# Patient Record
Sex: FEMALE | Race: WHITE | NOT HISPANIC OR LATINO | Employment: OTHER | URBAN - METROPOLITAN AREA
[De-identification: names, ages, dates, MRNs, and addresses within clinical notes are randomized per-mention and may not be internally consistent; named-entity substitution may affect disease eponyms.]

---

## 2020-03-11 ENCOUNTER — OFFICE VISIT (OUTPATIENT)
Dept: PAIN MEDICINE | Facility: CLINIC | Age: 70
End: 2020-03-11

## 2020-03-11 VITALS
RESPIRATION RATE: 18 BRPM | WEIGHT: 268 LBS | HEART RATE: 59 BPM | SYSTOLIC BLOOD PRESSURE: 150 MMHG | TEMPERATURE: 97.5 F | BODY MASS INDEX: 47.48 KG/M2 | OXYGEN SATURATION: 93 % | HEIGHT: 63 IN | DIASTOLIC BLOOD PRESSURE: 86 MMHG

## 2020-03-11 DIAGNOSIS — M54.16 LUMBAR RADICULOPATHY: Primary | ICD-10-CM

## 2020-03-11 DIAGNOSIS — E66.01 MORBIDLY OBESE (HCC): ICD-10-CM

## 2020-03-11 PROCEDURE — 99204 OFFICE O/P NEW MOD 45 MIN: CPT | Performed by: NURSE PRACTITIONER

## 2020-03-11 RX ORDER — ASPIRIN 81 MG/1
81 TABLET ORAL DAILY
COMMUNITY

## 2020-03-11 RX ORDER — ATORVASTATIN CALCIUM 40 MG/1
TABLET, FILM COATED ORAL DAILY
COMMUNITY
Start: 2019-12-04

## 2020-03-11 RX ORDER — MULTIVITAMIN
1 CAPSULE ORAL DAILY
COMMUNITY

## 2020-03-11 RX ORDER — TRAMADOL HYDROCHLORIDE 50 MG/1
50 TABLET ORAL EVERY 6 HOURS PRN
COMMUNITY
Start: 2020-03-05 | End: 2020-07-22

## 2020-03-11 RX ORDER — PREDNISONE 10 MG/1
TABLET ORAL
COMMUNITY
Start: 2020-03-05 | End: 2020-07-22

## 2020-03-11 NOTE — PROGRESS NOTES
CHIEF COMPLAINT  Ms. Behne has back pain that started 30 years ago. She states that her back pain radiates down her left leg.    Initial evaluation by FREDDY Corral    Subjective   Mary L Behne is a 69 y.o. female.   She presents to the office for evaluation of back pain and consideration of spinal cord stimulator phase 2. She was referred here by Dr. Freeman.     The patient's pain today is 9/10VAS in severity.  She states that her back pain started 30 years ago and has been progressing. Procedure visit from 2/26/2020 with Dr. Freeman reviewed.  The patient underwent placement of 2 percutaneous Abbott spinal cord stimulator leads using fluoroscopic guidance the patient was discharged home following this procedure.  She followed up in office on 3/3/2020 with Dr. Freeman.  Review of that office note shows 80% pain relief and a pain level of 2 out of 10 in her low back throughout the duration of her trial.  She was referred to our office for spinal cord stimulator phase 2 implantation.    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  She lives with her , son, and daughter.  She denies any history of tobacco use, she denies any history of alcohol use, and she denies any history of illicit substance use.  She states she does have family history of alcoholism and drug addiction (son).  She does have a family history of neck and back problems (both sons).     Patient has been working with [employer] with [employer].     Past pain medications: None     Current pain medications: Tramadol 50mg q6h PRN     Past therapies:  Physical Therapy: Yes, several rounds, not efficacious  Chiropractor: No  Massage Therapy: Yes  TENS: Yes, helpful at first, no longer efficacious  Neck or back surgery: L4-L5 discectomy & L5-S1 discectomy (2 separate procedures, performed in [place] and then in [place]).  Past pain management: [facility]- sees Dr. Freeman     Previous  Injections: most recently 12/16/20 LESI, pain relief x 6 days.     Under the care of Dr. Freeman the patient states she has had multiple epidural injections, lumbar MBB, and RFL without lasting pain relief.    Back Pain   This is a chronic problem. The current episode started more than 1 year ago. The problem occurs constantly. The problem has been gradually worsening since onset. The pain is present in the lumbar spine. The quality of the pain is described as aching and shooting (sharp). The pain radiates to the left thigh and left foot (posterior). The pain is at a severity of 9/10. The pain is severe. The pain is the same all the time. Pertinent negatives include no numbness or weakness. Risk factors include history of steroid use, menopause, obesity and poor posture (multiple injections, multiple joint surgeries (Bilateral hips and knees)). She has tried analgesics, bed rest, heat, ice, muscle relaxant and NSAIDs (PT, injections) for the symptoms. The treatment provided significant (SCS phase 1 trial ) relief.      PEG Assessment   What number best describes your pain on average in the past week?9  What number best describes how, during the past week, pain has interfered with your enjoyment of life?9  What number best describes how, during the past week, pain has interfered with your general activity?  9    Current Outpatient Medications:   •  aspirin 81 MG EC tablet, Take 81 mg by mouth Daily., Disp: , Rfl:   •  atorvastatin (LIPITOR) 40 MG tablet, , Disp: , Rfl:   •  Cholecalciferol (VITAMIN D3) 25 MCG (1000 UT) capsule, Take 1 capsule by mouth Daily., Disp: , Rfl:   •  Multiple Vitamin (MULTIVITAMIN) capsule, Take 1 capsule by mouth Daily., Disp: , Rfl:   •  predniSONE (DELTASONE) 10 MG tablet, TAKE 4 DAILY X 3 DAYS, THEN 3 X 3 DAYS, THEN 2 X 3 DAYS, THEN 1 X 5 AND STOP., Disp: , Rfl:   •  traMADol (ULTRAM) 50 MG tablet, Take 50 mg by mouth Every 6 (Six) Hours As Needed., Disp: , Rfl:     The following  "portions of the patient's history were reviewed and updated as appropriate: allergies, current medications, past family history, past medical history, past social history, past surgical history and problem list.    REVIEW OF PERTINENT MEDICAL DATA                Review of Systems   Constitutional: Positive for fatigue.   HENT: Negative for congestion.    Eyes: Positive for visual disturbance.   Respiratory: Negative for cough, shortness of breath and wheezing.    Cardiovascular: Negative.    Gastrointestinal: Negative for constipation and diarrhea.   Genitourinary: Negative for difficulty urinating.   Musculoskeletal: Positive for back pain.   Neurological: Negative for weakness and numbness.   Psychiatric/Behavioral: Negative for sleep disturbance and suicidal ideas. The patient is nervous/anxious.      Vitals:    03/11/20 0952   BP: 150/86   Pulse: 59   Resp: 18   Temp: 97.5 °F (36.4 °C)   SpO2: 93%   Weight: 122 kg (268 lb)   Height: 160 cm (63\")   PainSc:   9   PainLoc: Back     Objective   Physical Exam   Constitutional: She is oriented to person, place, and time. Vital signs are normal. She appears well-developed and well-nourished.   HENT:   Head: Normocephalic and atraumatic.   Eyes: Conjunctivae, EOM and lids are normal.   Neck: Trachea normal and normal range of motion.   Cardiovascular: Normal rate.   Pulmonary/Chest: Effort normal.   Abdominal: Normal appearance.   Musculoskeletal:        Lumbar back: She exhibits decreased range of motion, tenderness and pain.   POS Elvis SLR   Neurological: She is alert and oriented to person, place, and time. Gait abnormal.   Skin: Skin is warm, dry and intact.   Psychiatric: She has a normal mood and affect. Her speech is normal and behavior is normal. Judgment and thought content normal.   Nursing note and vitals reviewed.    Assessment/Plan   Jeri was seen today for back pain.    Diagnoses and all orders for this visit:    Lumbar radiculopathy  -     Case " Request    Morbidly obese (CMS/[facility])    --- Review of psychological evaluation for implantation of SCS report  --- Spinal cord stimulation Phase 2  With [employer]  ----------  Education about Spinal Cord Stimulation Phase 2... Implant of the SCS therapy:      -  This was an extended office visit in which we entered into discussion about advanced pain relieving techniques, and discussed implantable pain therapies.  We discussed advanced neuromodulation in the form of Spinal Cord Stimulation.  This is a reasonable therapy for patients who have exhausted basic nonnarcotic options, basic modalities and physical therapies, and do not have any other reasonable surgical options.  This therapy as an alternative to long term high dose opioid therapy.      -  Risks include but are not limited to bleeding, infection, injury, paralysis, nerve injury, dural puncture, and risk for postprocedural pain.  Implanted equipment risks include but are not limited to lead migration, lead fracture, risk of loss of pain relieving stimulation, risk of electrical shock, and risk of system failure.      - We discussed the theory and basic science behind SCS therapy including but not limited to energy delivery and relevant anatomy, in terms that are easy to understand and also with use of illustrative devices.  Spinal Cord Stimulation therapies apply an electromagnetic field to a specific area on the spinal cord (Dorsal Column) to attempt to block transmission of painful signals from the peripheral nerves to the brain.      -  We reviewed the education this patient received, and the fact that the patient had a favorable presurgical psychological evaluation.       - We discussed the successful trialing process (aka Phase 1) that this patient experienced.  We discussed the noted improvements in pain control &/or functional status during the trial.   Trial success of at least 50% relief determines whether or not we proceed to implant.  We  discussed reasonable expectations, and that I feel that consistent 50% pain relief during the trial deems it to have been medically successful, and is a reasonable expectation to justify moving forward to permanent implant.      - We discussed the percutaneous surgical implant, including postsurgical restrictions, risks, and alternatives.   For spinal cord stimulation implanted device (aka SCS Phase 2) there is usually a midline vertical incision for the spinally implanted leads, and also a horizontal incision in the posterior lumbar flank for implantation of the battery & computer (aka IPG).  The leads are tunneled from the midline incision to the medial aspect of the battery pocket incision.      - We plan to place the permanent electrodes in the same spinal region where the temporary trial produced relief.      -  Postoperative restrictions include limiting the following activity as much as possible for 90 days:  Lifting >10 lbs, bending at the waist, stretching/reaching overhead, and twisting.  During this time the patient is asked to not drive with the device turned on.    ----------  --- Follow-up after procedure      DMITRI REPORT    As part of the patient's treatment plan, I am prescribing controlled substances. The patient has been made aware of appropriate use of such medications, including potential risk of somnolence, limited ability to drive and/or work safely, and the potential for dependence or overdose. It has also bee made clear that these medications are for use by this patient only, without concomitant use of alcohol or other substances unless prescribed.     Patient has completed prescribing agreement detailing terms of continued prescribing of controlled substances, including monitoring DMITRI reports, urine drug screening, and pill counts if necessary. The patient is aware that inappropriate use will results in cessation of prescribing such medications.    DMITRI report has been reviewed and  scanned into the patient's chart.    As the clinician, I personally reviewed the DMITRI from 3/10/2020 while the patient was in the office today.    History and physical exam exhibit continued safe and appropriate use of controlled substances.     EMR Dragon/Transcription disclaimer:   Much of this encounter note is an electronic transcription/translation of spoken language to printed text. The electronic translation of spoken language may permit erroneous, or at times, nonsensical words or phrases to be inadvertently transcribed; Although I have reviewed the note for such errors, some may still exist.

## 2020-03-11 NOTE — PATIENT INSTRUCTIONS
----------  Education about Spinal Cord Stimulation Phase 2... Implant of the SCS therapy:      -  This was an extended office visit in which we entered into discussion about advanced pain relieving techniques, and discussed implantable pain therapies.  We discussed advanced neuromodulation in the form of Spinal Cord Stimulation.  This is a reasonable therapy for patients who have exhausted basic nonnarcotic options, basic modalities and physical therapies, and do not have any other reasonable surgical options.  This therapy as an alternative to long term high dose opioid therapy.      -  Risks include but are not limited to bleeding, infection, injury, paralysis, nerve injury, dural puncture, and risk for postprocedural pain.  Implanted equipment risks include but are not limited to lead migration, lead fracture, risk of loss of pain relieving stimulation, risk of electrical shock, and risk of system failure.      - We discussed the theory and basic science behind SCS therapy including but not limited to energy delivery and relevant anatomy, in terms that are easy to understand and also with use of illustrative devices.  Spinal Cord Stimulation therapies apply an electromagnetic field to a specific area on the spinal cord (Dorsal Column) to attempt to block transmission of painful signals from the peripheral nerves to the brain.      -  We reviewed the education this patient received, and the fact that the patient had a favorable presurgical psychological evaluation.       - We discussed the successful trialing process (aka Phase 1) that this patient experienced.  We discussed the noted improvements in pain control &/or functional status during the trial.   Trial success of at least 50% relief determines whether or not we proceed to implant.  We discussed reasonable expectations, and that I feel that consistent 50% pain relief during the trial deems it to have been medically successful, and is a reasonable  expectation to justify moving forward to permanent implant.      - We discussed the percutaneous surgical implant, including postsurgical restrictions, risks, and alternatives.   For spinal cord stimulation implanted device (aka SCS Phase 2) there is usually a midline vertical incision for the spinally implanted leads, and also a horizontal incision in the posterior lumbar flank for implantation of the battery & computer (aka IPG).  The leads are tunneled from the midline incision to the medial aspect of the battery pocket incision.      - We plan to place the permanent electrodes in the same spinal region where the temporary trial produced relief.      -  Postoperative restrictions include limiting the following activity as much as possible for 90 days:  Lifting >10 lbs, bending at the waist, stretching/reaching overhead, and twisting.  During this time the patient is asked to not drive with the device turned on.    ----------

## 2020-05-07 ENCOUNTER — TELEPHONE (OUTPATIENT)
Dept: PAIN MEDICINE | Facility: CLINIC | Age: 70
End: 2020-05-07

## 2020-05-07 NOTE — TELEPHONE ENCOUNTER
Pt called today to inquire about her SPINAL CORD STIMULATOR INSERTION PHASE 2 and when she will be implanted? Please advise. Thank you.

## 2020-05-08 NOTE — TELEPHONE ENCOUNTER
Because of excess BMI her implant must take place at the hospital.  We could schedule it whenever we get some time there.

## 2020-07-21 ENCOUNTER — TRANSCRIBE ORDERS (OUTPATIENT)
Dept: PREADMISSION TESTING | Facility: HOSPITAL | Age: 70
End: 2020-07-21

## 2020-07-21 DIAGNOSIS — Z01.818 OTHER SPECIFIED PRE-OPERATIVE EXAMINATION: Primary | ICD-10-CM

## 2020-07-22 ENCOUNTER — APPOINTMENT (OUTPATIENT)
Dept: PREADMISSION TESTING | Facility: HOSPITAL | Age: 70
End: 2020-07-22

## 2020-07-22 ENCOUNTER — HOSPITAL ENCOUNTER (OUTPATIENT)
Dept: GENERAL RADIOLOGY | Facility: HOSPITAL | Age: 70
Discharge: HOME | End: 2020-07-22
Admitting: ANESTHESIOLOGY

## 2020-07-22 VITALS
HEART RATE: 71 BPM | WEIGHT: 268 LBS | BODY MASS INDEX: 47.48 KG/M2 | DIASTOLIC BLOOD PRESSURE: 79 MMHG | SYSTOLIC BLOOD PRESSURE: 139 MMHG | TEMPERATURE: 99.2 F | HEIGHT: 63 IN | OXYGEN SATURATION: 98 % | RESPIRATION RATE: 18 BRPM

## 2020-07-22 LAB
ANION GAP SERPL CALCULATED.3IONS-SCNC: 12.5 MMOL/L (ref 5–15)
BUN SERPL-MCNC: 19 MG/DL (ref 8–23)
BUN/CREAT SERPL: 22.1 (ref 7–25)
CALCIUM SPEC-SCNC: 9.4 MG/DL (ref 8.6–10.5)
CHLORIDE SERPL-SCNC: 104 MMOL/L (ref 98–107)
CO2 SERPL-SCNC: 23.5 MMOL/L (ref 22–29)
CREAT SERPL-MCNC: 0.86 MG/DL (ref 0.57–1)
DEPRECATED RDW RBC AUTO: 44.3 FL (ref 37–54)
ERYTHROCYTE [DISTWIDTH] IN BLOOD BY AUTOMATED COUNT: 12.9 % (ref 12.3–15.4)
GFR SERPL CREATININE-BSD FRML MDRD: 65 ML/MIN/1.73
GLUCOSE SERPL-MCNC: 99 MG/DL (ref 65–99)
HCT VFR BLD AUTO: 45.4 % (ref 34–46.6)
HGB BLD-MCNC: 15.7 G/DL (ref 12–15.9)
MCH RBC QN AUTO: 32.6 PG (ref 26.6–33)
MCHC RBC AUTO-ENTMCNC: 34.6 G/DL (ref 31.5–35.7)
MCV RBC AUTO: 94.4 FL (ref 79–97)
PLATELET # BLD AUTO: 213 10*3/MM3 (ref 140–450)
PMV BLD AUTO: 11.1 FL (ref 6–12)
POTASSIUM SERPL-SCNC: 4.2 MMOL/L (ref 3.5–5.2)
RBC # BLD AUTO: 4.81 10*6/MM3 (ref 3.77–5.28)
SODIUM SERPL-SCNC: 140 MMOL/L (ref 136–145)
WBC # BLD AUTO: 6.09 10*3/MM3 (ref 3.4–10.8)

## 2020-07-22 PROCEDURE — 87081 CULTURE SCREEN ONLY: CPT | Performed by: ANESTHESIOLOGY

## 2020-07-22 PROCEDURE — 36415 COLL VENOUS BLD VENIPUNCTURE: CPT

## 2020-07-22 PROCEDURE — 80048 BASIC METABOLIC PNL TOTAL CA: CPT | Performed by: ANESTHESIOLOGY

## 2020-07-22 PROCEDURE — 85027 COMPLETE CBC AUTOMATED: CPT | Performed by: ANESTHESIOLOGY

## 2020-07-22 PROCEDURE — 71046 X-RAY EXAM CHEST 2 VIEWS: CPT

## 2020-07-22 PROCEDURE — 93005 ELECTROCARDIOGRAM TRACING: CPT

## 2020-07-22 PROCEDURE — 93010 ELECTROCARDIOGRAM REPORT: CPT | Performed by: INTERNAL MEDICINE

## 2020-07-22 NOTE — DISCHARGE INSTRUCTIONS
Take the following medications the morning of surgery: NONE    ARRIVE  AM    General Instructions:  • Do not eat solid food after midnight the night before surgery.  • You may drink clear liquids day of surgery but must stop at least one hour before your hospital arrival time.  • It is beneficial for you to have a clear drink that contains carbohydrates the day of surgery.  We suggest a 12 to 20 ounce bottle of Gatorade or Powerade for non-diabetic patients or a 12 to 20 ounce bottle of G2 or Powerade Zero for diabetic patients. (Pediatric patients, are not advised to drink a 12 to 20 ounce carbohydrate drink)    Clear liquids are liquids you can see through.  Nothing red in color.     Plain water                               Sports drinks  Sodas                                   Gelatin (Jell-O)  Fruit juices without pulp such as white grape juice and apple juice  Popsicles that contain no fruit or yogurt  Tea or coffee (no cream or milk added)  Gatorade / Powerade  G2 / Powerade Zero    • Infants may have breast milk up to four hours before surgery.  • Infants drinking formula may drink formula up to six hours before surgery.   • Patients who avoid smoking, chewing tobacco and alcohol for 4 weeks prior to surgery have a reduced risk of post-operative complications.  Quit smoking as many days before surgery as you can.  • Do not smoke, use chewing tobacco or drink alcohol the day of surgery.   • If applicable bring your C-PAP/ BI-PAP machine.  • Bring any papers given to you in the doctor’s office.  • Wear clean comfortable clothes.  • Do not wear contact lenses, false eyelashes or make-up.  Bring a case for your glasses.   • Bring crutches or walker if applicable.  • Remove all piercings.  Leave jewelry and any other valuables at home.  • Hair extensions with metal clips must be removed prior to surgery.  • The Pre-Admission Testing nurse will instruct you to bring medications if unable to obtain an  accurate list in Pre-Admission Testing.            Preventing a Surgical Site Infection:  • For 2 to 3 days before surgery, avoid shaving with a razor because the razor can irritate skin and make it easier to develop an infection.    • Any areas of open skin can increase the risk of a post-operative wound infection by allowing bacteria to enter and travel throughout the body.  Notify your surgeon if you have any skin wounds / rashes even if it is not near the expected surgical site.  The area will need assessed to determine if surgery should be delayed until it is healed.  • The night prior to surgery shower using a fresh bar of anti-bacterial soap (such as Dial) and clean washcloth.  Sleep in a clean bed with clean clothing.  Do not allow pets to sleep with you.  • Shower on the morning of surgery using a fresh bar of anti-bacterial soap (such as Dial) and clean washcloth.  Dry with a clean towel and dress in clean clothing.  • Ask your surgeon if you will be receiving antibiotics prior to surgery.  • Make sure you, your family, and all healthcare providers clean their hands with soap and water or an alcohol based hand  before caring for you or your wound.    Day of surgery:  Your arrival time is approximately two hours before your scheduled surgery time.  Upon arrival, a Pre-op nurse and Anesthesiologist will review your health history, obtain vital signs, and answer questions you may have.  The only belongings needed at this time will be a list of your home medications and if applicable your C-PAP/BI-PAP machine.  If you are staying overnight your family can leave the rest of your belongings in the car and bring them to your room later.  A Pre-op nurse will start an IV and you may receive medication in preparation for surgery, including something to help you relax.  Your family will be able to see you in the Pre-op area.  Two visitors at a time will be allowed in the Pre-op room.  While you are in  surgery your family should notify the waiting room  if they leave the waiting room area and provide a contact phone number.    Please be aware that surgery does come with discomfort.  We want to make every effort to control your discomfort so please discuss any uncontrolled symptoms with your nurse.   Your doctor will most likely have prescribed pain medications.      If you are going home after surgery you will receive individualized written care instructions before being discharged.  A responsible adult must drive you to and from the hospital on the day of your surgery and stay with you for 24 hours.    If you are staying overnight following surgery, you will be transported to your hospital room following the recovery period.  [facility] has all private rooms.    If you have any questions please call Pre-Admission Testing at [phone].  Deductibles and co-payments are collected on the day of service. Please be prepared to pay the required co-pay, deductible or deposit on the day of service as defined by your plan.    Patient Education for Self-Quarantine Process    Following your COVID testing, we strongly recommend that you do not leave your home after you have been tested for COVID except to get medical care. This includes not going to work, school or to public areas.  If this is not possible for you to do please limit your activities to only required outings.  Be sure to wear a mask when you are with other people, practice social distancing and wash your hands frequently.      The following items provide additional details to keep you safe.  • Wash your hands with soap and water frequently for at least 20 seconds.   • Avoid touching your eyes, nose and mouth with unwashed hands.  • Do not share anything - utensils, towels, food from the same bowl.   • Have your own utensils, drinking glass, dishes, towels and bedding.   • Do not have visitors.   • Do use FaceTime to stay in touch  with family and friends.  • You should stay in a specific room away from others if possible.   • Stay at least 6 feet away from others in the home if you cannot have a dedicated room to yourself.   • Do not snuggle with your pet. While the CDC says there is no evidence that pets can spread COVID-19 or be infected from humans, it is probably best to avoid “petting, snuggling, being kissed or licked and sharing food (during self-quarantine)”, according to the CDC.   • Sanitize household surfaces daily. Include all high touch areas (door handles, light switches, phones, countertops, etc.)  • Do not share a bathroom with others, if possible.   • Wear a mask around others in your home if you are unable to stay in a separate room or 6 feet apart. If  you are unable to wear a mask, have your family member wear a mask if they must be within 6 feet of you.   Call your surgeon immediately if you experience any of the following symptoms:  • Sore Throat  • Shortness of Breath or difficulty breathing  • Cough  • Chills  • Body soreness or muscle pain  • Headache  • Fever  • New loss of taste or smell  • Do not arrive for your surgery ill.  Your procedure will need to be rescheduled to another time.  You will need to call your physician before the day of surgery to avoid any unnecessary exposure to hospital staff as well as other patients.

## 2020-07-23 LAB — MRSA SPEC QL CULT: NORMAL

## 2020-07-24 ENCOUNTER — LAB (OUTPATIENT)
Dept: LAB | Facility: HOSPITAL | Age: 70
End: 2020-07-24

## 2020-07-24 DIAGNOSIS — Z01.818 OTHER SPECIFIED PRE-OPERATIVE EXAMINATION: ICD-10-CM

## 2020-07-24 PROCEDURE — C9803 HOPD COVID-19 SPEC COLLECT: HCPCS

## 2020-07-24 PROCEDURE — U0004 COV-19 TEST NON-CDC HGH THRU: HCPCS

## 2020-07-25 LAB
REF LAB TEST METHOD: NORMAL
SARS-COV-2 RNA RESP QL NAA+PROBE: NOT DETECTED

## 2020-07-26 PROCEDURE — S0260 H&P FOR SURGERY: HCPCS | Performed by: ANESTHESIOLOGY

## 2020-07-26 RX ORDER — SULFAMETHOXAZOLE AND TRIMETHOPRIM 800; 160 MG/1; MG/1
1 TABLET ORAL 2 TIMES DAILY
Qty: 20 TABLET | Refills: 0 | Status: SHIPPED | OUTPATIENT
Start: 2020-07-26

## 2020-07-27 ENCOUNTER — HOSPITAL ENCOUNTER (OUTPATIENT)
Facility: HOSPITAL | Age: 70
Setting detail: HOSPITAL OUTPATIENT SURGERY
Discharge: HOME | End: 2020-07-27
Attending: ANESTHESIOLOGY | Admitting: ANESTHESIOLOGY

## 2020-07-27 ENCOUNTER — ANESTHESIA (OUTPATIENT)
Dept: PERIOP | Facility: HOSPITAL | Age: 70
End: 2020-07-27

## 2020-07-27 ENCOUNTER — APPOINTMENT (OUTPATIENT)
Dept: GENERAL RADIOLOGY | Facility: HOSPITAL | Age: 70
End: 2020-07-27

## 2020-07-27 ENCOUNTER — ANESTHESIA EVENT (OUTPATIENT)
Dept: PERIOP | Facility: HOSPITAL | Age: 70
End: 2020-07-27

## 2020-07-27 VITALS
TEMPERATURE: 97.9 F | BODY MASS INDEX: 47.31 KG/M2 | RESPIRATION RATE: 16 BRPM | SYSTOLIC BLOOD PRESSURE: 146 MMHG | DIASTOLIC BLOOD PRESSURE: 81 MMHG | OXYGEN SATURATION: 94 % | HEIGHT: 63 IN | HEART RATE: 69 BPM | WEIGHT: 267 LBS

## 2020-07-27 PROCEDURE — 25010000002 VANCOMYCIN 10 G RECONSTITUTED SOLUTION: Performed by: ANESTHESIOLOGY

## 2020-07-27 PROCEDURE — C1778 LEAD, NEUROSTIMULATOR: HCPCS | Performed by: ANESTHESIOLOGY

## 2020-07-27 PROCEDURE — 63650 IMPLANT NEUROELECTRODES: CPT | Performed by: ANESTHESIOLOGY

## 2020-07-27 PROCEDURE — 25010000002 ONDANSETRON PER 1 MG: Performed by: NURSE ANESTHETIST, CERTIFIED REGISTERED

## 2020-07-27 PROCEDURE — 25010000002 PROPOFOL 10 MG/ML EMULSION: Performed by: NURSE ANESTHETIST, CERTIFIED REGISTERED

## 2020-07-27 PROCEDURE — 76000 FLUOROSCOPY <1 HR PHYS/QHP: CPT

## 2020-07-27 PROCEDURE — 63685 INS/RPLC SPI NPG/RCVR POCKET: CPT | Performed by: ANESTHESIOLOGY

## 2020-07-27 PROCEDURE — 25010000002 DEXAMETHASONE PER 1 MG: Performed by: NURSE ANESTHETIST, CERTIFIED REGISTERED

## 2020-07-27 PROCEDURE — 25010000002 FENTANYL CITRATE (PF) 100 MCG/2ML SOLUTION: Performed by: NURSE ANESTHETIST, CERTIFIED REGISTERED

## 2020-07-27 PROCEDURE — 72100 X-RAY EXAM L-S SPINE 2/3 VWS: CPT

## 2020-07-27 PROCEDURE — 25010000002 NEOSTIGMINE PER 0.5 MG: Performed by: NURSE ANESTHETIST, CERTIFIED REGISTERED

## 2020-07-27 PROCEDURE — C1767 GENERATOR, NEURO NON-RECHARG: HCPCS | Performed by: ANESTHESIOLOGY

## 2020-07-27 PROCEDURE — 25010000003 CEFAZOLIN PER 500 MG: Performed by: ANESTHESIOLOGY

## 2020-07-27 DEVICE — ANCHR LD SCS SWIFTLOCK 1192: Type: IMPLANTABLE DEVICE | Site: BACK | Status: FUNCTIONAL

## 2020-07-27 DEVICE — ABSORBABLE HEMOSTAT (OXIDIZED REGENERATED CELLULOSE, U.S.P.)
Type: IMPLANTABLE DEVICE | Site: BACK | Status: FUNCTIONAL
Brand: SURGICEL

## 2020-07-27 DEVICE — LD NEUROSTM OCTRODE 8/CHANEL 52MM 60CM: Type: IMPLANTABLE DEVICE | Site: BACK | Status: FUNCTIONAL

## 2020-07-27 DEVICE — GEN IPG SCS PROCLAIM/ELITE/5 NONRECHG 10N: Type: IMPLANTABLE DEVICE | Site: BACK | Status: FUNCTIONAL

## 2020-07-27 RX ORDER — LIDOCAINE HYDROCHLORIDE 20 MG/ML
INJECTION, SOLUTION INFILTRATION; PERINEURAL AS NEEDED
Status: DISCONTINUED | OUTPATIENT
Start: 2020-07-27 | End: 2020-07-27 | Stop reason: SURG

## 2020-07-27 RX ORDER — FLUMAZENIL 0.1 MG/ML
0.2 INJECTION INTRAVENOUS AS NEEDED
Status: DISCONTINUED | OUTPATIENT
Start: 2020-07-27 | End: 2020-07-27 | Stop reason: HOSPADM

## 2020-07-27 RX ORDER — BUPIVACAINE HYDROCHLORIDE AND EPINEPHRINE 5; 5 MG/ML; UG/ML
INJECTION, SOLUTION EPIDURAL; INTRACAUDAL; PERINEURAL AS NEEDED
Status: DISCONTINUED | OUTPATIENT
Start: 2020-07-27 | End: 2020-07-27 | Stop reason: HOSPADM

## 2020-07-27 RX ORDER — PROPOFOL 10 MG/ML
VIAL (ML) INTRAVENOUS AS NEEDED
Status: DISCONTINUED | OUTPATIENT
Start: 2020-07-27 | End: 2020-07-27 | Stop reason: SURG

## 2020-07-27 RX ORDER — HYDROCODONE BITARTRATE AND ACETAMINOPHEN 7.5; 325 MG/1; MG/1
1 TABLET ORAL ONCE AS NEEDED
Status: DISCONTINUED | OUTPATIENT
Start: 2020-07-27 | End: 2020-07-27 | Stop reason: HOSPADM

## 2020-07-27 RX ORDER — SODIUM CHLORIDE, SODIUM LACTATE, POTASSIUM CHLORIDE, CALCIUM CHLORIDE 600; 310; 30; 20 MG/100ML; MG/100ML; MG/100ML; MG/100ML
9 INJECTION, SOLUTION INTRAVENOUS CONTINUOUS
Status: DISCONTINUED | OUTPATIENT
Start: 2020-07-27 | End: 2020-07-27 | Stop reason: HOSPADM

## 2020-07-27 RX ORDER — FENTANYL CITRATE 50 UG/ML
50 INJECTION, SOLUTION INTRAMUSCULAR; INTRAVENOUS
Status: DISCONTINUED | OUTPATIENT
Start: 2020-07-27 | End: 2020-07-27 | Stop reason: HOSPADM

## 2020-07-27 RX ORDER — MIDAZOLAM HYDROCHLORIDE 1 MG/ML
1 INJECTION INTRAMUSCULAR; INTRAVENOUS
Status: DISCONTINUED | OUTPATIENT
Start: 2020-07-27 | End: 2020-07-27 | Stop reason: HOSPADM

## 2020-07-27 RX ORDER — SODIUM CHLORIDE 0.9 % (FLUSH) 0.9 %
3-10 SYRINGE (ML) INJECTION AS NEEDED
Status: DISCONTINUED | OUTPATIENT
Start: 2020-07-27 | End: 2020-07-27 | Stop reason: HOSPADM

## 2020-07-27 RX ORDER — FAMOTIDINE 10 MG/ML
20 INJECTION, SOLUTION INTRAVENOUS ONCE
Status: COMPLETED | OUTPATIENT
Start: 2020-07-27 | End: 2020-07-27

## 2020-07-27 RX ORDER — HYDROMORPHONE HYDROCHLORIDE 1 MG/ML
0.5 INJECTION, SOLUTION INTRAMUSCULAR; INTRAVENOUS; SUBCUTANEOUS
Status: DISCONTINUED | OUTPATIENT
Start: 2020-07-27 | End: 2020-07-27 | Stop reason: HOSPADM

## 2020-07-27 RX ORDER — ACETAMINOPHEN 325 MG/1
650 TABLET ORAL ONCE AS NEEDED
Status: DISCONTINUED | OUTPATIENT
Start: 2020-07-27 | End: 2020-07-27 | Stop reason: HOSPADM

## 2020-07-27 RX ORDER — SODIUM CHLORIDE 0.9 % (FLUSH) 0.9 %
3 SYRINGE (ML) INJECTION EVERY 12 HOURS SCHEDULED
Status: DISCONTINUED | OUTPATIENT
Start: 2020-07-27 | End: 2020-07-27 | Stop reason: HOSPADM

## 2020-07-27 RX ORDER — ONDANSETRON 2 MG/ML
4 INJECTION INTRAMUSCULAR; INTRAVENOUS ONCE AS NEEDED
Status: DISCONTINUED | OUTPATIENT
Start: 2020-07-27 | End: 2020-07-27 | Stop reason: HOSPADM

## 2020-07-27 RX ORDER — DEXAMETHASONE SODIUM PHOSPHATE 10 MG/ML
INJECTION INTRAMUSCULAR; INTRAVENOUS AS NEEDED
Status: DISCONTINUED | OUTPATIENT
Start: 2020-07-27 | End: 2020-07-27 | Stop reason: SURG

## 2020-07-27 RX ORDER — HYDRALAZINE HYDROCHLORIDE 20 MG/ML
5 INJECTION INTRAMUSCULAR; INTRAVENOUS
Status: DISCONTINUED | OUTPATIENT
Start: 2020-07-27 | End: 2020-07-27 | Stop reason: HOSPADM

## 2020-07-27 RX ORDER — FENTANYL CITRATE 50 UG/ML
INJECTION, SOLUTION INTRAMUSCULAR; INTRAVENOUS AS NEEDED
Status: DISCONTINUED | OUTPATIENT
Start: 2020-07-27 | End: 2020-07-27 | Stop reason: SURG

## 2020-07-27 RX ORDER — ONDANSETRON 2 MG/ML
INJECTION INTRAMUSCULAR; INTRAVENOUS AS NEEDED
Status: DISCONTINUED | OUTPATIENT
Start: 2020-07-27 | End: 2020-07-27 | Stop reason: SURG

## 2020-07-27 RX ORDER — MAGNESIUM HYDROXIDE 1200 MG/15ML
LIQUID ORAL AS NEEDED
Status: DISCONTINUED | OUTPATIENT
Start: 2020-07-27 | End: 2020-07-27 | Stop reason: HOSPADM

## 2020-07-27 RX ORDER — GLYCOPYRROLATE 0.2 MG/ML
INJECTION INTRAMUSCULAR; INTRAVENOUS AS NEEDED
Status: DISCONTINUED | OUTPATIENT
Start: 2020-07-27 | End: 2020-07-27 | Stop reason: SURG

## 2020-07-27 RX ORDER — ROCURONIUM BROMIDE 10 MG/ML
INJECTION, SOLUTION INTRAVENOUS AS NEEDED
Status: DISCONTINUED | OUTPATIENT
Start: 2020-07-27 | End: 2020-07-27 | Stop reason: SURG

## 2020-07-27 RX ORDER — LIDOCAINE HYDROCHLORIDE 10 MG/ML
0.5 INJECTION, SOLUTION EPIDURAL; INFILTRATION; INTRACAUDAL; PERINEURAL ONCE AS NEEDED
Status: DISCONTINUED | OUTPATIENT
Start: 2020-07-27 | End: 2020-07-27 | Stop reason: HOSPADM

## 2020-07-27 RX ORDER — NALOXONE HCL 0.4 MG/ML
0.2 VIAL (ML) INJECTION AS NEEDED
Status: DISCONTINUED | OUTPATIENT
Start: 2020-07-27 | End: 2020-07-27 | Stop reason: HOSPADM

## 2020-07-27 RX ADMIN — DEXAMETHASONE SODIUM PHOSPHATE 4 MG: 10 INJECTION INTRAMUSCULAR; INTRAVENOUS at 08:50

## 2020-07-27 RX ADMIN — ONDANSETRON HYDROCHLORIDE 4 MG: 2 SOLUTION INTRAMUSCULAR; INTRAVENOUS at 10:07

## 2020-07-27 RX ADMIN — FENTANYL CITRATE 50 MCG: 50 INJECTION INTRAMUSCULAR; INTRAVENOUS at 08:41

## 2020-07-27 RX ADMIN — NEOSTIGMINE METHYLSULFATE 3 MG: 1 INJECTION INTRAMUSCULAR; INTRAVENOUS; SUBCUTANEOUS at 10:49

## 2020-07-27 RX ADMIN — LIDOCAINE HYDROCHLORIDE 60 MG: 20 INJECTION, SOLUTION INFILTRATION; PERINEURAL at 08:41

## 2020-07-27 RX ADMIN — GLYCOPYRROLATE 0.6 MG: 0.2 INJECTION INTRAMUSCULAR; INTRAVENOUS at 10:49

## 2020-07-27 RX ADMIN — FENTANYL CITRATE 25 MCG: 50 INJECTION INTRAMUSCULAR; INTRAVENOUS at 09:44

## 2020-07-27 RX ADMIN — VANCOMYCIN HYDROCHLORIDE 1750 MG: 10 INJECTION, POWDER, LYOPHILIZED, FOR SOLUTION INTRAVENOUS at 08:14

## 2020-07-27 RX ADMIN — ROCURONIUM BROMIDE 40 MG: 10 INJECTION INTRAVENOUS at 08:41

## 2020-07-27 RX ADMIN — FENTANYL CITRATE 25 MCG: 50 INJECTION INTRAMUSCULAR; INTRAVENOUS at 10:40

## 2020-07-27 RX ADMIN — SODIUM CHLORIDE, POTASSIUM CHLORIDE, SODIUM LACTATE AND CALCIUM CHLORIDE: 600; 310; 30; 20 INJECTION, SOLUTION INTRAVENOUS at 08:36

## 2020-07-27 RX ADMIN — PROPOFOL 160 MG: 10 INJECTION, EMULSION INTRAVENOUS at 08:41

## 2020-07-27 RX ADMIN — FAMOTIDINE 20 MG: 10 INJECTION INTRAVENOUS at 08:19

## 2020-07-27 NOTE — ANESTHESIA PROCEDURE NOTES
Airway  Urgency: elective    Date/Time: 7/27/2020 8:42 AM  Airway not difficult    General Information and Staff    Patient location during procedure: OR  Anesthesiologist: Akhil Munguia MD  CRNA: Astrid Barnes CRNA    Indications and Patient Condition  Indications for airway management: airway protection    Preoxygenated: yes (pt pre-O2 with 100% O2)  Mask difficulty assessment: 2 - vent by mask + OA or adjuvant +/- NMBA (easy BMV )    Final Airway Details  Final airway type: endotracheal airway      Successful airway: ETT  Cuffed: yes   Successful intubation technique: direct laryngoscopy  Endotracheal tube insertion site: oral  Blade: Charlie  Blade size: 3  ETT size (mm): 7.0  Cormack-Lehane Classification: grade I - full view of glottis  Placement verified by: chest auscultation and capnometry   Cuff volume (mL): 7  Measured from: lips  ETT/EBT  to lips (cm): 20  Number of attempts at approach: 1  Assessment: lips, teeth, and gum same as pre-op and atraumatic intubation    Additional Comments  ATOETx1. No change in dentition.

## 2020-07-27 NOTE — ANESTHESIA POSTPROCEDURE EVALUATION
Patient: Mary L Behne    Procedure Summary     Date:  07/27/20 Room / Location:  [facility] OR 06 / [facility] MAIN OR    Anesthesia Start:  0836 Anesthesia Stop:  1105    Procedure:  SPINAL CORD STIMULATOR INSERTION PHASE 2 (N/A Back) Diagnosis:       Lumbar radiculopathy      (Lumbar radiculopathy [M54.16])    Surgeon:  Evaristo Reveles MD Provider:  Akhil Munguia MD    Anesthesia Type:  general ASA Status:  3          Anesthesia Type: general    Vitals  Vitals Value Taken Time   /79 7/27/2020 11:10 AM   Temp     Pulse 70 7/27/2020 11:11 AM   Resp     SpO2 96 % 7/27/2020 11:11 AM   Vitals shown include unvalidated device data.        Post Anesthesia Care and Evaluation    Patient location during evaluation: PACU  Patient participation: complete - patient participated  Level of consciousness: awake and alert  Pain management: adequate  Airway patency: patent  Anesthetic complications: No anesthetic complications    Cardiovascular status: acceptable  Respiratory status: acceptable  Hydration status: acceptable    Comments: -------------------------              07/27/20                    0759        -------------------------   BP:         152/82        Pulse:        73          Resp:         20          Temp:   36.7 °C (98 °F)   SpO2:         97%        -------------------------

## 2020-07-27 NOTE — ANESTHESIA PREPROCEDURE EVALUATION
Anesthesia Evaluation     NPO Solid Status: > 8 hours             Airway   Mallampati: II  TM distance: >3 FB  Neck ROM: full  Dental          Pulmonary - normal exam   (+) sleep apnea on CPAP,   (-) shortness of breath  Cardiovascular - normal exam    (+) hyperlipidemia,       Neuro/Psych  GI/Hepatic/Renal/Endo    (+) morbid obesity,      Musculoskeletal     Abdominal    Substance History      OB/GYN          Other                        Anesthesia Plan    ASA 3     general       Anesthetic plan, all risks, benefits, and alternatives have been provided, discussed and informed consent has been obtained with: patient.

## 2020-07-28 ENCOUNTER — TELEPHONE (OUTPATIENT)
Dept: PAIN MEDICINE | Facility: CLINIC | Age: 70
End: 2020-07-28

## 2020-07-28 NOTE — TELEPHONE ENCOUNTER
I never got any calls.  Were there any calls to the on call line?  We could see the patient in office tomorrow if not improving

## 2020-07-28 NOTE — TELEPHONE ENCOUNTER
Pt called left voicemail regarding SPINAL CORD STIMULATOR INSERTION PHASE 2 placed yesterday. Pt sts her right foot pain has increased more than her back pain since surgery, has also had difficulty sleeping. Please advise. Thank you.

## 2020-07-28 NOTE — TELEPHONE ENCOUNTER
Called and spoke with Danna. Danna std she saw pt in recovery last night and pt c/o of right foot pain and tingling. Danna turned down the SCS in recovery last night to where it is almost turned off . She told the pt it could be nerve irritation and not the SCS that could be causing her right foot pain and tingling. Danna told pt to try that setting for a week and Danna will f/u with pt to see how she is doing. Danna said she will call pt to f/u, she also said you can call her if you would like.

## 2020-07-30 ENCOUNTER — TELEPHONE (OUTPATIENT)
Dept: PAIN MEDICINE | Facility: CLINIC | Age: 70
End: 2020-07-30

## 2020-07-30 RX ORDER — GABAPENTIN 400 MG/1
400 CAPSULE ORAL 3 TIMES DAILY
Qty: 50 CAPSULE | Refills: 0 | Status: SHIPPED | OUTPATIENT
Start: 2020-07-30

## 2020-07-30 NOTE — TELEPHONE ENCOUNTER
Pt called again stating she is having severe right foot pain and tingling since SCS surgery. Told pt to contact Danna KENT for further assistance. Please advise. Thank you.

## 2020-07-31 NOTE — TELEPHONE ENCOUNTER
I spoke with Alisa this morning she would like you to see the pt. Called Danna this morning her voicemail std she is on vacation until 8/3/20. Called pt, she sts her pinching and pain in right foot has decreased from 10/10 to 6/10 pain level. She sts she turned her SCS back up and is feeling a little better. I told her you escribed gabapentin to her pharmacy and to try it today and over the weekend and see if it helps. Pt was thankful for gabapentin and sts she will pick it up today. Also gave pt [facility] number to call if she needs any SCS assistance over the weekend since Danna is gone til Monday. Pt is scheduled to see you on 8/3/20 at 0930. Ree will call [facility] to be available at appt on 8/3/20. Please advise. Thank you..

## 2020-07-31 NOTE — TELEPHONE ENCOUNTER
No.  2nd case was moved up to 8am.  Please let her know I called in some Gabapentin.  She can take it up to [facility] for the nerve pain.  She could be seen tomorrow by APRN or on Monday by me.

## 2020-08-10 ENCOUNTER — OFFICE VISIT (OUTPATIENT)
Dept: PAIN MEDICINE | Facility: CLINIC | Age: 70
End: 2020-08-10

## 2020-08-10 VITALS
SYSTOLIC BLOOD PRESSURE: 142 MMHG | OXYGEN SATURATION: 95 % | WEIGHT: 268.8 LBS | HEART RATE: 87 BPM | BODY MASS INDEX: 47.63 KG/M2 | HEIGHT: 63 IN | RESPIRATION RATE: 20 BRPM | TEMPERATURE: 98.2 F | DIASTOLIC BLOOD PRESSURE: 86 MMHG

## 2020-08-10 DIAGNOSIS — G89.29 OTHER CHRONIC PAIN: Primary | ICD-10-CM

## 2020-08-10 DIAGNOSIS — M54.16 LUMBAR RADICULOPATHY: ICD-10-CM

## 2020-08-10 PROCEDURE — 99024 POSTOP FOLLOW-UP VISIT: CPT | Performed by: NURSE PRACTITIONER

## 2020-08-10 NOTE — PROGRESS NOTES
"CHIEF COMPLAINT  F/u back pain. Pt sts pain has improved since SPINAL CORD STIMULATOR INSERTION PHASE 2 on 7/27/20.    Subjective   Mary L Behne is a 70 y.o. female  who presents for follow-up.  She has a history of chronic back and extremity pain. Reports her pain is improved.    Patient presents for follow-up of PROCEDURE. Patient had a [facility] SCS performed by Dr. BAIG on 7-27-20 for management of LOW BACK AND LEG PAIN. Patient reports 90% relief from the procedure. \"I don't realize how much pain I'm in because i've been in it for so long, that when i'm out of it, it's amazing.\"    Patient was treated with Bactrim 1 BID for 10 days by Dr Baig post-operatively. Completed this.  Denies any fever, chills, headaches. Denies any SOA or chest pain.    Previously under the care of Dr Freeman. Referred here for SCS implant.     Patient remained masked during entire encounter. No cough present. I donned a mask and gloves throughout entire visit. Prior to donning mask and gloves, hand hygiene was performed, as well as when it was doffed.  I was closer than 6 feet, but not for an extended period of time. No obvious exposure to any bodily fluids.    Back Pain   This is a chronic problem. The current episode started more than 1 year ago. The problem occurs constantly. The problem has been waxing and waning since onset. The pain is present in the lumbar spine. The pain radiates to the left foot, left thigh and left knee. The pain is at a severity of 1/10. The pain is mild. The symptoms are aggravated by bending, position, standing and twisting. Pertinent negatives include no chest pain, fever, numbness or weakness. Risk factors include lack of exercise, menopause, obesity and sedentary lifestyle. She has tried bed rest, chiropractic manipulation, heat, home exercises, ice, muscle relaxant, NSAIDs and walking for the symptoms. The treatment provided mild relief.      PEG Assessment   What number best describes your pain " "on average in the past week?0  What number best describes how, during the past week, pain has interfered with your enjoyment of life?0  What number best describes how, during the past week, pain has interfered with your general activity?  0    The following portions of the patient's history were reviewed and updated as appropriate: allergies, current medications, past family history, past medical history, past social history, past surgical history and problem list.    Review of Systems   Constitutional: Positive for activity change (improved) and fatigue. Negative for fever.   HENT: Negative for congestion.    Eyes: Positive for visual disturbance.   Respiratory: Positive for shortness of breath (occ w exertion). Negative for cough and wheezing.    Cardiovascular: Negative for chest pain.   Gastrointestinal: Negative for constipation and diarrhea.   Genitourinary: Negative for difficulty urinating.   Musculoskeletal: Positive for back pain and gait problem (cane).   Allergic/Immunologic: Negative for immunocompromised state.   Neurological: Negative for dizziness, weakness, light-headedness and numbness.   Psychiatric/Behavioral: Negative for agitation, sleep disturbance and suicidal ideas. The patient is nervous/anxious.      I have reviewed and confirmed the accuracy of the ROS as documented by the MA/LPN/RN FREDDY Almanza      Vitals:    08/10/20 0837   BP: 142/86   Pulse: 87   Resp: 20   Temp: 98.2 °F (36.8 °C)   SpO2: 95%   Weight: 122 kg (268 lb 12.8 oz)   Height: 160 cm (63\")   PainSc: 0-No pain   PainLoc: Back     Objective   Physical Exam   Constitutional: She is oriented to person, place, and time. Vital signs are normal. She appears well-developed and well-nourished. She is cooperative.   HENT:   Head: Normocephalic and atraumatic.   Nose: Nose normal.   Eyes: Conjunctivae and lids are normal.   Cardiovascular: Normal rate, regular rhythm and normal heart sounds.   Pulmonary/Chest: Effort normal " and breath sounds normal. No respiratory distress.   Abdominal: Normal appearance.   Neurological: She is alert and oriented to person, place, and time. Gait (cane) abnormal.   Skin: Skin is warm, dry and intact.        Psychiatric: She has a normal mood and affect. Her speech is normal and behavior is normal. Judgment and thought content normal. Cognition and memory are normal.   Nursing note and vitals reviewed.          Assessment/Plan   Jeri was seen today for back pain.    Diagnoses and all orders for this visit:    Other chronic pain    Lumbar radiculopathy      ---  Reviewed signs and symptoms of infection, including but not limited to-- fever, chills, stiff neck, headache, flu-like symptoms, increased tenderness and redness to incisions, as well as drainage or swelling. If patient notices this, she is to call office immediately. Patient verbalized understanding.  --- Reprogramming with [facility]. Does not currently require.  --- Follow-up 4 weeks or sooner if needed.         EMR Dragon/Transcription disclaimer:   Much of this encounter note is an electronic transcription/translation of spoken language to printed text. The electronic translation of spoken language may permit erroneous, or at times, nonsensical words or phrases to be inadvertently transcribed; Although I have reviewed the note for such errors, some may still exist.

## 2020-09-11 ENCOUNTER — OFFICE VISIT (OUTPATIENT)
Dept: PAIN MEDICINE | Facility: CLINIC | Age: 70
End: 2020-09-11

## 2020-09-11 VITALS
HEIGHT: 63 IN | TEMPERATURE: 96.9 F | DIASTOLIC BLOOD PRESSURE: 75 MMHG | SYSTOLIC BLOOD PRESSURE: 129 MMHG | HEART RATE: 74 BPM | OXYGEN SATURATION: 95 % | BODY MASS INDEX: 48.05 KG/M2 | RESPIRATION RATE: 20 BRPM | WEIGHT: 271.2 LBS

## 2020-09-11 DIAGNOSIS — G89.29 OTHER CHRONIC PAIN: ICD-10-CM

## 2020-09-11 DIAGNOSIS — M54.16 LUMBAR RADICULOPATHY: ICD-10-CM

## 2020-09-11 DIAGNOSIS — Z96.89 S/P INSERTION OF SPINAL CORD STIMULATOR: Primary | ICD-10-CM

## 2020-09-11 PROCEDURE — 99212 OFFICE O/P EST SF 10 MIN: CPT | Performed by: NURSE PRACTITIONER

## 2020-09-11 NOTE — PROGRESS NOTES
CHIEF COMPLAINT  F/u back pain. Pt sts pain is improved since last ov.     Subjective   Mary L Behne is a 70 y.o. female  who presents for follow-up.  She has a history of back pain. She underwent SCS Phase 2 implantation ([facility]) on 7/27/2020 performed by Dr. Reveles. Today she reports 100% pain relief with her SCS.     She is here for an incision check today. Incision over IPG site has 2 open areas. 1 cm open area on left hand side of the incision and approximately 3 mm open area on right hand side of the incision.  No drainage, erythema, edema, or tenderness with palpation.     Back Pain   This is a chronic problem. The current episode started more than 1 year ago. The problem occurs constantly. The problem has been gradually worsening since onset. The pain is present in the lumbar spine. The quality of the pain is described as aching and shooting (sharp). The pain radiates to the left thigh and left foot (posterior). The pain is at a severity of 0/10. The pain is severe. The pain is the same all the time. Pertinent negatives include no abdominal pain, chest pain, fever, headaches, numbness or weakness. Risk factors include history of steroid use, menopause, obesity and poor posture (multiple injections, multiple joint surgeries (Bilateral hips and knees)). She has tried analgesics, bed rest, heat, ice, muscle relaxant and NSAIDs (PT, injections) for the symptoms. The treatment provided significant (SCS) relief.      PEG Assessment   What number best describes your pain on average in the past week?0  What number best describes how, during the past week, pain has interfered with your enjoyment of life?0  What number best describes how, during the past week, pain has interfered with your general activity?  0    The following portions of the patient's history were reviewed and updated as appropriate: allergies, current medications, past family history, past medical history, past social history, past  "surgical history and problem list.    Review of Systems   Constitutional: Positive for activity change (improved). Negative for fatigue and fever.   HENT: Negative for congestion.    Eyes: Negative for visual disturbance.   Respiratory: Positive for apnea (sleep apnea cpap at night) and shortness of breath (occ). Negative for cough.    Cardiovascular: Negative for chest pain.   Gastrointestinal: Negative for abdominal pain, constipation and diarrhea.   Genitourinary: Negative for difficulty urinating.   Musculoskeletal: Positive for back pain (scs) and gait problem (cane). Negative for joint swelling.   Allergic/Immunologic: Negative for immunocompromised state.   Neurological: Negative for dizziness, weakness, light-headedness, numbness and headaches.   Psychiatric/Behavioral: Negative for agitation, sleep disturbance and suicidal ideas. The patient is not nervous/anxious.      --  The aforementioned information the Chief Complaint section and above subjective data including any HPI data, and also the Review of Systems data, has been personally reviewed and affirmed.  --    Vitals:    09/11/20 0839   BP: 129/75   Pulse: 74   Resp: 20   Temp: 96.9 °F (36.1 °C)   SpO2: 95%   Weight: 123 kg (271 lb 3.2 oz)   Height: 160 cm (63\")   PainSc: 0-No pain   PainLoc: Back         Objective   Physical Exam   Constitutional: She is oriented to person, place, and time. Vital signs are normal. She appears well-developed and well-nourished.   HENT:   Head: Normocephalic and atraumatic.   Eyes: Conjunctivae, EOM and lids are normal.   Neck: Trachea normal and normal range of motion.   Cardiovascular: Normal rate.   Pulmonary/Chest: Effort normal.   Abdominal: Normal appearance.   Neurological: She is alert and oriented to person, place, and time.   Skin: Skin is warm and dry.        Psychiatric: She has a normal mood and affect. Her speech is normal and behavior is normal. Judgment normal.   Nursing note and vitals " reviewed.    Assessment/Plan   Jeri was seen today for back pain.    Diagnoses and all orders for this visit:    S/P insertion of spinal cord stimulator    Lumbar radiculopathy    Other chronic pain      --- Patient incision assessed by both FREDDY Solares and Dr. Ly. Cleaned with Chlorhexidine and checked for tunneling with sterile swab. Steri-strips x 4 applied with Mastisol.  --- Patient instructed to keep all waist bands off of the incision site, patient states understanding.  --- Red flag symptoms reviewed including increased pain, swelling, erythema, or drainage of the incision site, malaise, fever, muscle aches or chills patient is to call the office immediately. Patient states understanding.   --- Declines need for SCS reprogramming at this time.   --- Follow-up 2 weeks for incision check     DMITRI REPORT  DMITRI report has been reviewed and scanned into the patient's chart.    As the clinician, I personally reviewed the DMITRI from 9/10/2020 while the patient was in the office today.    EMR Dragon/Transcription disclaimer:   Much of this encounter note is an electronic transcription/translation of spoken language to printed text. The electronic translation of spoken language may permit erroneous, or at times, nonsensical words or phrases to be inadvertently transcribed; Although I have reviewed the note for such errors, some may still exist.

## 2020-09-25 ENCOUNTER — OFFICE VISIT (OUTPATIENT)
Dept: PAIN MEDICINE | Facility: CLINIC | Age: 70
End: 2020-09-25

## 2020-09-25 VITALS
OXYGEN SATURATION: 95 % | HEART RATE: 66 BPM | SYSTOLIC BLOOD PRESSURE: 126 MMHG | DIASTOLIC BLOOD PRESSURE: 83 MMHG | TEMPERATURE: 96.9 F | BODY MASS INDEX: 47.63 KG/M2 | WEIGHT: 268.8 LBS | HEIGHT: 63 IN | RESPIRATION RATE: 20 BRPM

## 2020-09-25 DIAGNOSIS — M54.16 LUMBAR RADICULOPATHY: ICD-10-CM

## 2020-09-25 DIAGNOSIS — G89.29 OTHER CHRONIC PAIN: ICD-10-CM

## 2020-09-25 DIAGNOSIS — Z96.89 S/P INSERTION OF SPINAL CORD STIMULATOR: Primary | ICD-10-CM

## 2020-09-25 PROCEDURE — 99212 OFFICE O/P EST SF 10 MIN: CPT | Performed by: NURSE PRACTITIONER

## 2020-09-25 NOTE — PROGRESS NOTES
CHIEF COMPLAINT  F/u back pain. Pt sts pain has improved since last ov.     Subjective   Mary L Behne is a 70 y.o. female  who presents for follow-up.  She has a history of back pain.  She underwent SCS phase 2 implantation ([facility])  On 7/27/2020 performed by Dr. Reveles. She reports 100% pain relief with the SCS.  She is  Here for an incision check today.     Back Pain  This is a chronic problem. The current episode started more than 1 year ago. The problem occurs constantly. The problem has been gradually worsening since onset. The pain is present in the lumbar spine. The quality of the pain is described as aching and shooting (sharp). The pain radiates to the left thigh and left foot (posterior). The pain is at a severity of 0/10. The pain is severe. The pain is the same all the time. Pertinent negatives include no abdominal pain, chest pain, fever, headaches, numbness or weakness. Risk factors include history of steroid use, menopause, obesity and poor posture (multiple injections, multiple joint surgeries (Bilateral hips and knees)). The treatment provided significant (SCS) relief.        PEG Assessment   What number best describes your pain on average in the past week?0  What number best describes how, during the past week, pain has interfered with your enjoyment of life?0  What number best describes how, during the past week, pain has interfered with your general activity?  0    The following portions of the patient's history were reviewed and updated as appropriate: allergies, current medications, past family history, past medical history, past social history, past surgical history and problem list.    Review of Systems   Constitutional: Positive for activity change (improved). Negative for fatigue and fever.   HENT: Negative for congestion.    Eyes: Negative for visual disturbance.   Respiratory: Positive for apnea (cpap at night) and shortness of breath (occ). Negative for cough.   "  Cardiovascular: Negative for chest pain.   Gastrointestinal: Negative for abdominal pain, constipation, diarrhea and nausea.   Genitourinary: Negative for difficulty urinating.   Musculoskeletal: Positive for back pain (scs) and gait problem (cane). Negative for joint swelling.   Neurological: Negative for dizziness, seizures, weakness, light-headedness, numbness and headaches.   Psychiatric/Behavioral: Negative for agitation, sleep disturbance and suicidal ideas. The patient is not nervous/anxious.      --  The aforementioned information the Chief Complaint section and above subjective data including any HPI data, and also the Review of Systems data, has been personally reviewed and affirmed.  --    Vitals:    09/25/20 0949   BP: 126/83   Pulse: 66   Resp: 20   Temp: 96.9 °F (36.1 °C)   SpO2: 95%   Weight: 122 kg (268 lb 12.8 oz)   Height: 160 cm (63\")   PainSc: 0-No pain   PainLoc: Back     Objective   Physical Exam  Vitals signs and nursing note reviewed.   Constitutional:       Appearance: Normal appearance. She is well-developed.   HENT:      Head: Normocephalic and atraumatic.   Eyes:      General: Lids are normal.      Conjunctiva/sclera: Conjunctivae normal.   Neck:      Musculoskeletal: Normal range of motion.      Trachea: Trachea normal.   Cardiovascular:      Rate and Rhythm: Normal rate.   Pulmonary:      Effort: Pulmonary effort is normal.   Skin:     General: Skin is warm and dry.          Neurological:      Mental Status: She is alert and oriented to person, place, and time.   Psychiatric:         Speech: Speech normal.         Behavior: Behavior normal.         Judgment: Judgment normal.         Assessment/Plan   Jeri was seen today for back pain.    Diagnoses and all orders for this visit:    S/P insertion of spinal cord stimulator    Lumbar radiculopathy    Other chronic pain      --- Reviewed red flag symptoms. Patient to notify our office immediately for any increased incisional pain, redness, " swelling, drainage, malaise, fever, or chills. Patient states  Understanding.   --- Follow-up if pain returns or worsens.      DMITRI REPORT    DMITRI report has been reviewed and scanned into the patient's chart.    As the clinician, I personally reviewed the DMITRI from 9/24/2020 while the patient was in the office today.    EMR Dragon/Transcription disclaimer:   Much of this encounter note is an electronic transcription/translation of spoken language to printed text. The electronic translation of spoken language may permit erroneous, or at times, nonsensical words or phrases to be inadvertently transcribed; Although I have reviewed the note for such errors, some may still exist.

## (undated) DEVICE — DRAPE,REIN 53X77,STERILE: Brand: MEDLINE

## (undated) DEVICE — DRAPE,CHEST,FENES,15X10,STERIL: Brand: MEDLINE

## (undated) DEVICE — DRSNG SURESITE WNDW 4X4.5

## (undated) DEVICE — ELECTRD BLD EDGE/INSUL1P 2.4X5.1MM STRL

## (undated) DEVICE — ADHS SKIN DERMABOND TOP ADVANCED

## (undated) DEVICE — SUT VIC 2/0 CT2 27IN J269H

## (undated) DEVICE — DRSNG TELFA PAD NONADH STR 1S 3X4IN

## (undated) DEVICE — 1010 S-DRAPE TOWEL DRAPE 10/BX: Brand: STERI-DRAPE™

## (undated) DEVICE — GLV SURG BIOGEL M LTX PF 7 1/2

## (undated) DEVICE — IRRIGATOR BULB ASEPTO 60CC STRL

## (undated) DEVICE — SUT SILK 0/0 CT2 18IN C027D

## (undated) DEVICE — NDL HYPO PRECISIONGLIDE REG 25G 1 1/2

## (undated) DEVICE — TOWEL,OR,DSP,ST,BLUE,STD,4/PK,20PK/CS: Brand: MEDLINE

## (undated) DEVICE — Device: Brand: PORTEX

## (undated) DEVICE — SUT VIC 3/0 PS2 27IN J427H

## (undated) DEVICE — APPL CHLORAPREP HI/LITE 26ML ORNG

## (undated) DEVICE — LOU MINOR PROCEDURE: Brand: MEDLINE INDUSTRIES, INC.

## (undated) DEVICE — SPNG LAP 18X18IN LF STRL PK/5

## (undated) DEVICE — GLV SURG SENSICARE PI LF PF 8 GRN STRL

## (undated) DEVICE — DRP C/ARM 41X74IN

## (undated) DEVICE — 3M™ IOBAN™ 2 ANTIMICROBIAL INCISE DRAPE 6650EZ: Brand: IOBAN™ 2

## (undated) DEVICE — SHLD ANGIO 2LAYR CIR FEN